# Patient Record
Sex: FEMALE | Race: AMERICAN INDIAN OR ALASKA NATIVE | Employment: UNEMPLOYED | URBAN - NONMETROPOLITAN AREA
[De-identification: names, ages, dates, MRNs, and addresses within clinical notes are randomized per-mention and may not be internally consistent; named-entity substitution may affect disease eponyms.]

---

## 2023-06-01 ENCOUNTER — HOSPITAL ENCOUNTER (EMERGENCY)
Facility: OTHER | Age: 78
Discharge: HOME | End: 2023-06-01
Attending: PHYSICIAN ASSISTANT
Payer: MEDICARE

## 2023-06-01 VITALS
SYSTOLIC BLOOD PRESSURE: 173 MMHG | HEIGHT: 66 IN | RESPIRATION RATE: 16 BRPM | OXYGEN SATURATION: 98 % | HEART RATE: 83 BPM | WEIGHT: 150 LBS | TEMPERATURE: 98.5 F | BODY MASS INDEX: 24.11 KG/M2 | DIASTOLIC BLOOD PRESSURE: 93 MMHG

## 2023-06-01 RX ORDER — ROSUVASTATIN CALCIUM 20 MG/1
20 TABLET, COATED ORAL
COMMUNITY
Start: 2023-05-12

## 2023-06-01 RX ORDER — ALENDRONATE SODIUM 70 MG/1
70 TABLET ORAL
COMMUNITY
Start: 2023-05-12

## 2023-06-01 RX ORDER — LISINOPRIL 30 MG/1
1 TABLET ORAL DAILY
COMMUNITY
Start: 2023-05-12

## 2023-06-01 NOTE — ED TRIAGE NOTES
Pt here with family with c/o right sided eye and facial swelling after being bit by a mosquito on Monday, pt denies any tongue or lip swelling or any SOB, pt has obvious swelling and redness to right side of face, pt last took benadryl at 0430, pt out into waiting room   Triage Assessment     Row Name 06/01/23 1123       Triage Assessment (Adult)    Airway WDL WDL       Respiratory WDL    Respiratory WDL WDL       Skin Circulation/Temperature WDL    Skin Circulation/Temperature WDL WDL       Cardiac WDL    Cardiac WDL WDL       Peripheral/Neurovascular WDL    Peripheral Neurovascular WDL WDL       Cognitive/Neuro/Behavioral WDL    Cognitive/Neuro/Behavioral WDL WDL

## 2023-06-01 NOTE — ED PROVIDER NOTES
This patient left the emergency department prior to me evaluating her I did not evaluate this patient.  She left on her own the patient's name was placed in a room I signed up for her but then she eloped from the department and left on her own.     Arnol Navas, PAItaloC  06/01/23 8950